# Patient Record
Sex: FEMALE | Race: WHITE | NOT HISPANIC OR LATINO | Employment: OTHER | URBAN - METROPOLITAN AREA
[De-identification: names, ages, dates, MRNs, and addresses within clinical notes are randomized per-mention and may not be internally consistent; named-entity substitution may affect disease eponyms.]

---

## 2023-03-20 PROBLEM — R73.03 PREDIABETES: Status: ACTIVE | Noted: 2023-03-20

## 2023-03-20 PROBLEM — E03.9 HYPOTHYROIDISM: Status: ACTIVE | Noted: 2023-03-20

## 2023-03-20 PROBLEM — E04.2 NONTOXIC MULTINODULAR GOITER: Status: ACTIVE | Noted: 2023-03-20

## 2024-11-18 ENCOUNTER — OFFICE VISIT (OUTPATIENT)
Facility: CLINIC | Age: 64
End: 2024-11-18
Payer: MEDICARE

## 2024-11-18 VITALS
BODY MASS INDEX: 32.72 KG/M2 | HEIGHT: 62 IN | HEART RATE: 77 BPM | SYSTOLIC BLOOD PRESSURE: 122 MMHG | OXYGEN SATURATION: 98 % | RESPIRATION RATE: 18 BRPM | WEIGHT: 177.81 LBS | DIASTOLIC BLOOD PRESSURE: 78 MMHG

## 2024-11-18 DIAGNOSIS — E04.2 NONTOXIC MULTINODULAR GOITER: Primary | ICD-10-CM

## 2024-11-18 NOTE — PROGRESS NOTES
[facility] Endocrine Surgery History & Physical         Name: Mirta Thomas   : 1960   MRN: [id number]      History of Present Illness     Chief Compliant: thyroid nodules    HPI:  Mirta Thomas is a 63 y.o. female who presents to endocrine surgery clinic to discuss her thyroid nodules. Recall that she has a right-sided/isthmus nodule that has grown slowly over time. We biopsied it when it was approximately 1.7 cm and that was benign. It also has benign features on the ultrasound. She also has an area of somewhat less circumscribed nodularity on the left side there were monitoring. She tells me that she has very burdensome insurance coverage that would make it hard for her to have procedures or surgery this year. No changes to her voice, no compressive symptoms.      Past Medical History:   Diagnosis Date    Colon polyp     Diverticulosis     Family history of colon cancer     mother    Hypothyroidism     Internal hemorrhoids      Past Surgical History:   Procedure Laterality Date     SECTION      x2    COLONOSCOPY N/A 2022    Procedure: COLONOSCOPY;  Surgeon: Christ Maciel MD;  Location: [facility];  Service: Endoscopy;  Laterality: N/A;  COVID POS WITHIN 90 DAYS    COLONOSCOPY W/ POLYPECTOMY      HYSTERECTOMY      KNEE SURGERY Right      Family History   Problem Relation Name Age of Onset    Colon cancer Mother      Coronary artery disease Father      Pacemaker/defibrilator Father      Irritable bowel syndrome Sister      Irritable bowel syndrome Brother       Patient is allergic to sulfa (sulfonamide antibiotics).  Social Drivers of Health     Tobacco Use: Unknown (2024)    Patient History     Smoking Tobacco Use: Never     Smokeless Tobacco Use: Unknown     Passive Exposure: Not on file   Alcohol Use: Not on file   Financial Resource Strain: Not on file   Food Insecurity: Not on file   Transportation Needs: Not on file   Physical Activity: Not on file   Stress: Not on file  "  Housing Stability: Not on file   Depression: Not on file   Utilities: Not on file   Health Literacy: Not on file   Social Isolation: Not on file       Home Medications:   Prior to Admission medications    Medication Sig Start Date End Date Taking? Authorizing Provider   aspirin (ECOTRIN) 81 MG EC tablet Take 81 mg by mouth once daily.   Yes Provider, Historical   cholecalciferol, vitamin D3, 125 mcg (5,000 unit) Tab Take 5,000 Units by mouth once daily.   Yes Provider, Historical   coenzyme Q10 200 mg capsule Take 200 mg by mouth once daily.   Yes Provider, Historical   lovastatin (MEVACOR) 40 MG tablet Take 40 mg by mouth every evening.   Yes Provider, Historical   SYNTHROID 50 mcg tablet TAKE 1 TABLET BEFORE BREAKFAST FOR HYPOTHYROIDISM 3/21/24  Yes Wen Robins MD   WEGOVY 1.7 mg/0.75 mL PnIj INJECT 1.7 MG INTO THE SKIN EVERY 7 DAYS 1/3/24 1/2/25 Yes Wen Robins MD       Review of Systems: Relevant review of systems elicited and pertinent information is in the HPI    Physical Exam:     Vitals:  Vitals:    11/18/24 0823   BP: 122/78   Pulse: 77   Resp: 18   SpO2: 98%   Weight: 80.6 kg (177 lb 12.8 oz)   Height: 5' 2" (1.575 m)         General Appearance:  Alert, cooperative, no distress, appears stated age   Head:  Normocephalic, without obvious abnormality, atraumatic   Eyes:  No scleral icterus   Nose: Nares normal, septum midline   Throat: Lips, mucosa, and tongue normal   Neck: Supple, symmetrical, trachea midline, speaks with normal voice   Lungs:   respirations unlabored   Heart:  Regular rate    Abdomen:   Soft, non-tender, non-distended   Genitalia:  Deferred    Rectal:  Deferred    Extremities: Extremities normal, atraumatic, no cyanosis or edema   Pulses: 2+ and symmetric   Skin: No jaundice   Neurologic: No focal deficits        Clinical Data Reviewed:       Labs and imaging personally reviewed and interpreted, as discussed in HPI and A&P.    Surgeon ultrasound performed in office on " 11/18/2024    I performed a complete ultrasound of the neck and thyroid gland. Images saved in the media tab.  Left lobe: there is an isoechoic area on the left that is about 2 cm and this is hard to tell if it is a true nodule  Right lobe: the previously 2 cm is now about 3 cm, still appears hyperechoic and well circumscribed  Central lymph nodes: not seen  Lateral lymph nodes: normal morphology        Assessment and plan:     This is a 63-year-old woman with multiple thyroid nodules including a right-sided nodule that was biopsied benign when it was 1.7 cm couple of years ago and is now 3 cm.  On the left side there is a 2 cm area which may represent an isoechoic benign-appearing nodule.  We discussed the natural history of thyroid nodules and options for management.  Given that her nodule is growing, I think repeat biopsy versus thyroidectomy is reasonable.  We discussed those options.  For now, she wants to think about biopsy bilaterally.  If she decides on thyroid surgery, she will give me a call.  We discussed the advantages and disadvantages of both approaches.  Overall I emphasized that I believe her nodules are benign and that the reason for doing thyroid surgery would likely be to obviate the need for continued surveillance and repeat biopsies.  She understands and we will be in touch.    History, physical exam, laboratory, and radiographic findings were reviewed.        Justin Ram MD, [facility]  General and Endocrine Surgery         8:27 AM, 11/18/2024

## 2024-12-02 ENCOUNTER — TELEPHONE (OUTPATIENT)
Dept: SURGERY | Facility: CLINIC | Age: 64
End: 2024-12-02
Payer: COMMERCIAL

## 2024-12-02 NOTE — TELEPHONE ENCOUNTER
----- Message from Paul sent at 12/2/2024  9:31 AM CST -----  Type:  Needs Medical Advice    Who Called: Pt  Would the patient rather a call back or a response via [facility]? call  Best Call Back Number: [phone]  Additional Information: Pt would like a call from nurse in office regarding a call to schedule her biopsy appointment was to be schedule for this coming Friday 12/06/2024 please call and confirm

## 2024-12-06 ENCOUNTER — OFFICE VISIT (OUTPATIENT)
Facility: CLINIC | Age: 64
End: 2024-12-06
Payer: MEDICARE

## 2024-12-06 VITALS
DIASTOLIC BLOOD PRESSURE: 74 MMHG | RESPIRATION RATE: 18 BRPM | SYSTOLIC BLOOD PRESSURE: 118 MMHG | HEIGHT: 62 IN | BODY MASS INDEX: 32.57 KG/M2 | OXYGEN SATURATION: 98 % | HEART RATE: 78 BPM | WEIGHT: 177 LBS

## 2024-12-06 DIAGNOSIS — E04.2 NONTOXIC MULTINODULAR GOITER: Primary | ICD-10-CM

## 2024-12-06 RX ORDER — EZETIMIBE 10 MG/1
10 TABLET ORAL DAILY
COMMUNITY
Start: 2024-12-05

## 2024-12-06 NOTE — PROGRESS NOTES
[facility] & Physical         Name: Mirta Thomas   : 1960   MRN: [id number]      History of Present Illness     Chief Compliant: thyroid nodules    HPI:  Mirta Thomas is a 64 y.o. female who presents to endocrine surgery clinic to discuss her thyroid nodules. Recall that she has a right-sided/isthmus nodule that has grown slowly over time. We biopsied it when it was approximately 1.7 cm and that was benign. It also has benign features on the ultrasound. She also has an area of somewhat less circumscribed nodularity on the left side there were monitoring. She tells me that she has very burdensome insurance coverage that would make it hard for her to have procedures or surgery this year. No changes to her voice, no compressive symptoms.    Update 24 - no changes, here to discuss biopsy       Past Medical History:   Diagnosis Date    Colon polyp     Diverticulosis     Family history of colon cancer     mother    Hypothyroidism     Internal hemorrhoids      Past Surgical History:   Procedure Laterality Date     SECTION      x2    COLONOSCOPY N/A 2022    Procedure: COLONOSCOPY;  Surgeon: Christ Maciel MD;  Location: [facility];  Service: Endoscopy;  Laterality: N/A;  COVID POS WITHIN 90 DAYS    COLONOSCOPY W/ POLYPECTOMY      HYSTERECTOMY      KNEE SURGERY Right      Family History   Problem Relation Name Age of Onset    Colon cancer Mother      Coronary artery disease Father      Pacemaker/defibrilator Father      Irritable bowel syndrome Sister      Irritable bowel syndrome Brother       Patient is allergic to sulfa (sulfonamide antibiotics).  Social Drivers of Health     Tobacco Use: Unknown (2024)    Patient History     Smoking Tobacco Use: Never     Smokeless Tobacco Use: Unknown     Passive Exposure: Not on file   Alcohol Use: Not on file   Financial Resource Strain: Not on file   Food Insecurity: Not on file   Transportation Needs: Not on file  "  Physical Activity: Not on file   Stress: Not on file   Housing Stability: Not on file   Depression: Not on file   Utilities: Not on file   Health Literacy: Not on file   Social Isolation: Not on file       Home Medications:   Prior to Admission medications    Medication Sig Start Date End Date Taking? Authorizing Provider   aspirin (ECOTRIN) 81 MG EC tablet Take 81 mg by mouth once daily.   Yes Provider, Historical   cholecalciferol, vitamin D3, 125 mcg (5,000 unit) Tab Take 5,000 Units by mouth once daily.   Yes Provider, Historical   coenzyme Q10 200 mg capsule Take 200 mg by mouth once daily.   Yes Provider, Historical   lovastatin (MEVACOR) 40 MG tablet Take 40 mg by mouth every evening.   Yes Provider, Historical   SYNTHROID 50 mcg tablet TAKE 1 TABLET BEFORE BREAKFAST FOR HYPOTHYROIDISM 3/21/24  Yes Wen Robins MD   WEGOVY 1.7 mg/0.75 mL PnIj INJECT 1.7 MG INTO THE SKIN EVERY 7 DAYS 1/3/24 1/2/25 Yes Wen Robins MD       Review of Systems: Relevant review of systems elicited and pertinent information is in the HPI    Physical Exam:     Vitals:  Vitals:    12/06/24 1316   BP: 118/74   Pulse: 78   Resp: 18   SpO2: 98%   Weight: 80.3 kg (177 lb)   Height: 5' 2" (1.575 m)         General Appearance:  Alert, cooperative, no distress, appears stated age   Head:  Normocephalic, without obvious abnormality, atraumatic   Eyes:  No scleral icterus   Nose: Nares normal, septum midline   Throat: Lips, mucosa, and tongue normal   Neck: Supple, symmetrical, trachea midline, speaks with normal voice   Lungs:   respirations unlabored   Heart:  Regular rate    Abdomen:   Soft, non-tender, non-distended   Genitalia:  Deferred    Rectal:  Deferred    Extremities: Extremities normal, atraumatic, no cyanosis or edema   Pulses: 2+ and symmetric   Skin: No jaundice   Neurologic: No focal deficits        Clinical Data Reviewed:       Labs and imaging personally reviewed and interpreted, as discussed in HPI and " A&P.    Surgeon ultrasound performed in office on 12/06/2024    I performed a complete ultrasound of the neck and thyroid gland. Images saved in the media tab.  Left lobe: there is an isoechoic area on the left that is about 2 cm and this is hard to tell if it is a true nodule  Right lobe: the previously 2 cm is now about 3 cm, still appears hyperechoic and well circumscribed  Central lymph nodes: not seen  Lateral lymph nodes: normal morphology            Assessment and plan:     This is a 63-year-old woman with multiple thyroid nodules including a right-sided nodule that was biopsied benign when it was 1.7 cm couple of years ago and is now 3 cm.  On the left side there is a 2 cm area which may represent an isoechoic benign-appearing nodule.  We discussed the natural history of thyroid nodules and options for management.  Given that her nodule is growing, I think repeat biopsy versus thyroidectomy is reasonable.  She wants to proceed with biopsy. She will return in 2-4 weeks for results.     Procedure note:  Thyroid Fine Needle Aspiration - right   I reviewed the technical and convalescent details of the procedure with the patient, as well as the risks, benefits and alternatives.  The patient consented in writing to proceed.  The patient was positioned appropriately and the procedure and side confirmed in a preprocedural pause.  I used alcohol swabs to clean the skin.  I then instilled a small amount of 1% lidocaine over the target lesion.  I used ultrasound to localize the lesion.  I then performed the ultrasound-guided fine-needle aspiration for the cytology tube, consisting of 3 passes with the 27 gauge needle and one pass with the 25 gauge needle.  I then performed a final pass with a 27 gauge needle and placed this into the molecular tube.  The patient tolerated the procedure without any complication.  The samples were appropriately labeled and conveyed to the lap see the media tab for additional  documentation.    History, physical exam, laboratory, and radiographic findings were reviewed.        Justin Ram MD, [facility]  General and Endocrine Surgery         8:27 AM, 12/6/2024

## 2024-12-26 ENCOUNTER — OFFICE VISIT (OUTPATIENT)
Dept: SURGERY | Facility: CLINIC | Age: 64
End: 2024-12-26
Payer: MEDICARE

## 2024-12-26 DIAGNOSIS — E04.2 NONTOXIC MULTINODULAR GOITER: Primary | ICD-10-CM

## 2024-12-26 NOTE — PROGRESS NOTES
Established Patient - Audio Only Telehealth Visit     The patient location is: [place]  The chief complaint leading to consultation is: thyroid nodules  Visit type: Virtual visit with audio only (telephone)  Total time spent with patient: 10 minutes       The reason for the audio only service rather than synchronous audio and video virtual visit was related to technical difficulties or patient preference/necessity.     Each patient to whom I provide medical services by telemedicine is:  (1) informed of the relationship between the physician and patient and the respective role of any other health care provider with respect to management of the patient; and (2) notified that they may decline to receive medical services by telemedicine and may withdraw from such care at any time. Patient verbally consented to receive this service via voice-only telephone call.       [facility] Endocrine Surgery History & Physical         Name: Mirta Thomas   : 1960   MRN: [id number]      History of Present Illness     Chief Compliant: thyroid nodules    HPI:  Mirta Thmoas is a 64 y.o. female who presents to endocrine surgery clinic to discuss her thyroid nodules. Recall that she has a right-sided/isthmus nodule that has grown slowly over time. We biopsied it when it was approximately 1.7 cm and that was benign. It also has benign features on the ultrasound. She also has an area of somewhat less circumscribed nodularity on the left side there were monitoring. She tells me that she has very burdensome insurance coverage that would make it hard for her to have procedures or surgery this year. No changes to her voice, no compressive symptoms.    Update 24 - no changes, here to discuss biopsy       Past Medical History:   Diagnosis Date    Colon polyp     Diverticulosis     Family history of colon cancer     mother    Hypothyroidism     Internal hemorrhoids      Past Surgical History:   Procedure Laterality Date      SECTION      x2    COLONOSCOPY N/A 2022    Procedure: COLONOSCOPY;  Surgeon: Christ Maciel MD;  Location: [facility];  Service: Endoscopy;  Laterality: N/A;  COVID POS WITHIN 90 DAYS    COLONOSCOPY W/ POLYPECTOMY      HYSTERECTOMY      KNEE SURGERY Right      Family History   Problem Relation Name Age of Onset    Colon cancer Mother      Coronary artery disease Father      Pacemaker/defibrilator Father      Irritable bowel syndrome Sister      Irritable bowel syndrome Brother       Patient is allergic to sulfa (sulfonamide antibiotics).  Social Drivers of Health     Tobacco Use: Unknown (2024)    Patient History     Smoking Tobacco Use: Never     Smokeless Tobacco Use: Unknown     Passive Exposure: Not on file   Alcohol Use: Not on file   Financial Resource Strain: Not on file   Food Insecurity: Not on file   Transportation Needs: Not on file   Physical Activity: Not on file   Stress: Not on file   Housing Stability: Not on file   Depression: Not on file   Utilities: Not on file   Health Literacy: Not on file   Social Isolation: Not on file       Home Medications:   Prior to Admission medications    Medication Sig Start Date End Date Taking? Authorizing Provider   aspirin (ECOTRIN) 81 MG EC tablet Take 81 mg by mouth once daily.   Yes Provider, Historical   cholecalciferol, vitamin D3, 125 mcg (5,000 unit) Tab Take 5,000 Units by mouth once daily.   Yes Provider, Historical   coenzyme Q10 200 mg capsule Take 200 mg by mouth once daily.   Yes Provider, Historical   lovastatin (MEVACOR) 40 MG tablet Take 40 mg by mouth every evening.   Yes Provider, Historical   SYNTHROID 50 mcg tablet TAKE 1 TABLET BEFORE BREAKFAST FOR HYPOTHYROIDISM 3/21/24  Yes Wen Robins MD   WEGOVY 1.7 mg/0.75 mL PnIj INJECT 1.7 MG INTO THE SKIN EVERY 7 DAYS 1/3/24 1/2/25 Yes Wen Robins MD       Review of Systems: Relevant review of systems elicited and pertinent information is in the HPI    Physical Exam:      Vitals:  There were no vitals filed for this visit.        General Appearance:  Alert, cooperative, no distress, appears stated age   Head:  Normocephalic, without obvious abnormality, atraumatic   Eyes:  No scleral icterus   Nose: Nares normal, septum midline   Throat: Lips, mucosa, and tongue normal   Neck: Supple, symmetrical, trachea midline, speaks with normal voice   Lungs:   respirations unlabored   Heart:  Regular rate    Abdomen:   Soft, non-tender, non-distended   Genitalia:  Deferred    Rectal:  Deferred    Extremities: Extremities normal, atraumatic, no cyanosis or edema   Pulses: 2+ and symmetric   Skin: No jaundice   Neurologic: No focal deficits        Clinical Data Reviewed:       Labs and imaging personally reviewed and interpreted, as discussed in HPI and A&P.    Surgeon ultrasound performed in office    I performed a complete ultrasound of the neck and thyroid gland. Images saved in the media tab.  Left lobe: there is an isoechoic area on the left that is about 2 cm and this is hard to tell if it is a true nodule  Right lobe: the previously 2 cm is now about 3 cm, still appears hyperechoic and well circumscribed  Central lymph nodes: not seen  Lateral lymph nodes: normal morphology            Assessment and plan:     This is a 63-year-old woman with multiple thyroid nodules including a right-sided nodule that was biopsied benign when it was 1.7 cm couple of years ago and is now 3 cm.  On the left side there is a 2 cm area which may represent an isoechoic benign-appearing nodule.  We discussed the natural history of thyroid nodules and options for management.      Repeat biopsy benign.     Follow up with me in one year or sooner if any change to symptoms or self exam.         Justin Ram MD, FACS  General and Endocrine Surgery         8:27 AM, 12/26/2024                              This service was not originating from a related E/M service provided within the previous 7 days nor will it  lead to an E/M service or procedure within the next 24 hours or my soonest available appointment.  Prevailing standard of care was able to be met in this audio-only visit.